# Patient Record
Sex: MALE | Race: WHITE | NOT HISPANIC OR LATINO | Employment: UNEMPLOYED | URBAN - METROPOLITAN AREA
[De-identification: names, ages, dates, MRNs, and addresses within clinical notes are randomized per-mention and may not be internally consistent; named-entity substitution may affect disease eponyms.]

---

## 2024-08-28 ENCOUNTER — OFFICE VISIT (OUTPATIENT)
Dept: FAMILY MEDICINE CLINIC | Facility: CLINIC | Age: 34
End: 2024-08-28
Payer: COMMERCIAL

## 2024-08-28 VITALS
HEIGHT: 73 IN | DIASTOLIC BLOOD PRESSURE: 68 MMHG | WEIGHT: 200 LBS | HEART RATE: 83 BPM | SYSTOLIC BLOOD PRESSURE: 118 MMHG | TEMPERATURE: 98.4 F | OXYGEN SATURATION: 98 % | BODY MASS INDEX: 26.51 KG/M2

## 2024-08-28 DIAGNOSIS — Z76.89 ENCOUNTER TO ESTABLISH CARE WITH NEW DOCTOR: ICD-10-CM

## 2024-08-28 DIAGNOSIS — Z23 IMMUNIZATION DUE: Primary | ICD-10-CM

## 2024-08-28 DIAGNOSIS — Z72.52 HIGH RISK HOMOSEXUAL BEHAVIOR: ICD-10-CM

## 2024-08-28 DIAGNOSIS — F17.200 NICOTINE DEPENDENCE, UNCOMPLICATED, UNSPECIFIED NICOTINE PRODUCT TYPE: ICD-10-CM

## 2024-08-28 DIAGNOSIS — L98.9 SKIN LESION: ICD-10-CM

## 2024-08-28 DIAGNOSIS — L70.9 ACNE, UNSPECIFIED ACNE TYPE: ICD-10-CM

## 2024-08-28 PROBLEM — F15.91 HISTORY OF METHAMPHETAMINE USE: Status: ACTIVE | Noted: 2024-08-05

## 2024-08-28 PROBLEM — F31.10 BIPOLAR AFFECTIVE DISORDER, CURRENT EPISODE MANIC: Status: ACTIVE | Noted: 2024-08-01

## 2024-08-28 PROBLEM — F29 PSYCHOSIS: Status: ACTIVE | Noted: 2024-08-02

## 2024-08-28 PROCEDURE — 99385 PREV VISIT NEW AGE 18-39: CPT | Performed by: FAMILY MEDICINE

## 2024-08-28 PROCEDURE — 90471 IMMUNIZATION ADMIN: CPT | Performed by: FAMILY MEDICINE

## 2024-08-28 PROCEDURE — 1126F AMNT PAIN NOTED NONE PRSNT: CPT | Performed by: FAMILY MEDICINE

## 2024-08-28 PROCEDURE — 2014F MENTAL STATUS ASSESS: CPT | Performed by: FAMILY MEDICINE

## 2024-08-28 PROCEDURE — 1159F MED LIST DOCD IN RCRD: CPT | Performed by: FAMILY MEDICINE

## 2024-08-28 PROCEDURE — 90715 TDAP VACCINE 7 YRS/> IM: CPT | Performed by: FAMILY MEDICINE

## 2024-08-28 PROCEDURE — 1160F RVW MEDS BY RX/DR IN RCRD: CPT | Performed by: FAMILY MEDICINE

## 2024-08-28 RX ORDER — RISPERIDONE 3 MG/1
3 TABLET ORAL 2 TIMES DAILY
COMMUNITY

## 2024-08-28 RX ORDER — HYDROXYZINE PAMOATE 25 MG/1
1 CAPSULE ORAL 3 TIMES DAILY
COMMUNITY
Start: 2024-08-22

## 2024-08-28 RX ORDER — NICOTINE 21 MG/24HR
1 PATCH, TRANSDERMAL 24 HOURS TRANSDERMAL EVERY 24 HOURS
Qty: 30 EACH | Refills: 0 | Status: SHIPPED | OUTPATIENT
Start: 2024-08-28

## 2024-08-28 RX ORDER — LITHIUM CARBONATE 300 MG/1
300 CAPSULE ORAL 2 TIMES DAILY WITH MEALS
COMMUNITY
Start: 2024-08-09

## 2024-08-28 NOTE — LETTER
[facility]  Vaccine Consent Form    Patient Name:  Zac Vizcarra  Patient :  1990     Vaccine(s) Ordered    Tdap Vaccine Greater Than or Equal To 6yo IM        Screening Checklist  The following questions should be completed prior to vaccination. If you answer “yes” to any question, it does not necessarily mean you should not be vaccinated. It just means we may need to clarify or ask more questions. If a question is unclear, please ask your healthcare provider to explain it.    Yes No   Any fever or moderate to severe illness today (mild illness and/or antibiotic treatment are not contraindications)?     Do you have a history of a serious reaction to any previous vaccinations, such as anaphylaxis, encephalopathy within 7 days, Guillain-Salisbury syndrome within 6 weeks, seizure?     Have you received any live vaccine(s) (e.g MMR, MARY) or any other vaccines in the last month (to ensure duplicate doses aren't given)?     Do you have an anaphylactic allergy to latex (DTaP, DTaP-IPV, Hep A, Hep B, MenB, RV, Td, Tdap), baker’s yeast (Hep B, HPV), polysorbates (RSV, nirsevimab, PCV 20, Rotavirrus, Tdap, Shingrix), or gelatin (MARY, MMR)?     Do you have an anaphylactic allergy to neomycin (Rabies, MARY, MMR, IPV, Hep A), polymyxin B (IPV), or streptomycin (IPV)?      Any cancer, leukemia, AIDS, or other immune system disorder? (MARY, MMR, RV)     Do you have a parent, brother, or sister with an immune system problem (if immune competence of vaccine recipient clinically verified, can proceed)? (MMR, MARY)     Any recent steroid treatments for >2 weeks, chemotherapy, or radiation treatment? (MARY, MMR)     Have you received antibody-containing blood transfusions or IVIG in the past 11 months (recommended interval is dependent on product)? (MMR, MARY)     Have you taken antiviral drugs (acyclovir, famciclovir, valacyclovir for MARY) in the last 24 or 48 hours, respectively?      Are you pregnant or planning to become  "pregnant within 1 month? (MARY, MMR, HPV, IPV, MenB, Abrexvy; For Hep B- refer to Engerix-B; For RSV - Abrysvo is indicated for 32-36 weeks of pregnancy from September to January)     For infants, have you ever been told your child has had intussusception or a medical emergency involving obstruction of the intestine (Rotavirus)? If not for an infant, can skip this question.         *Ordering Physicians/APC should be consulted if \"yes\" is checked by the patient or guardian above.  I have received, read, and understand the Vaccine Information Statement (VIS) for each vaccine ordered.  I have considered my or my child's health status as well as the health status of my close contacts.  I have taken the opportunity to discuss my vaccine questions with my or my child's health care provider.   I have requested that the ordered vaccine(s) be given to me or my child.  I understand the benefits and risks of the vaccines.  I understand that I should remain in the clinic for 15 minutes after receiving the vaccine(s).  _________________________________________________________  Signature of Patient or Parent/Legal Guardian ____________________  Date     "

## 2024-08-28 NOTE — PROGRESS NOTES
Male Physical Note      Date: 2024   Patient Name: Zac Vizcarra  : 1990   MRN: [id number]     Chief Complaint:    Chief Complaint   Patient presents with    Annual Exam    Establish Care       History of Present Illness: Zac Vizcarra is a 34 y.o. male who is here today for their annual health maintenance and physical.       Previously seeing:  Vanessa HATHAWAY [facility].  Lives on [address].    From reconcile outside meds, patient has been prescribed Lithium,Lisdexamphetamine 40 mg, hydroxyzine, risperidone 3 mg.   Appears this was prescribed by Zeb.      Sees NP Kirit Bustamante at [facility].  Reports he is also seeing therapist.  Was hospitalized for possible miko   Reports Taking Lithium, Vistaril and Risperdal.  Taking for about a month.    Thinks he has a mood disorder such as Bipolar.  Reports he had his levels checked    Was in Hospital about amonth ago.  [facility].    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    Reports had labs-uk in chart.      Subjective      Review of Systems:   Review of Systems   Constitutional:  Negative for chills and fever.   Gastrointestinal:  Positive for nausea (occ). Negative for vomiting.   Neurological:  Negative for seizures and syncope.   Psychiatric/Behavioral:  Negative for suicidal ideas.        Past Medical History, Social History, Family History and Care Team were all reviewed with patient and updated as appropriate.     Medications:     Current Outpatient Medications:     hydrOXYzine pamoate (VISTARIL) 25 MG capsule, Take 1 capsule by mouth 3 times a day., Disp: , Rfl:     lithium carbonate 300 MG capsule, Take 1 capsule by mouth 2 (Two) Times a Day With Meals., Disp: , Rfl:     risperiDONE (risperDAL) 3 MG tablet, Take 1 tablet by mouth 2 (Two) Times a Day., Disp: , Rfl:     nicotine (Nicoderm CQ) 14 MG/24HR patch, Place 1 patch on the skin as directed by provider Daily., Disp: 30  each, Rfl: 0    Allergies:   Allergies   Allergen Reactions    Codeine Nausea Only    Promethazine Nausea Only    Promethazine-Codeine Nausea Only       Immunizations:  Health Maintenance Summary            Overdue - BMI FOLLOWUP (Yearly) Never done      No completion, postpone, or frequency change history exists for this topic.              Overdue - Hepatitis B (3 of 3 - 3-dose series) Overdue since 7/1/2003 05/06/2003  Imm Admin: Hep B, Adolescent or Pediatric    10/01/2001  Imm Admin: Hep B, Adolescent or Pediatric              INFLUENZA VACCINE (Yearly - August to March) Due since 8/1/2024      No completion, postpone, or frequency change history exists for this topic.              COVID-19 Vaccine (3 - 2023-24 season) Due since 9/1/2024 08/28/2024  Postponed until 11/17/2024 by Zehra Du MA (Product Unavailable)    04/08/2021  Imm Admin: COVID-19 (PFIZER) Purple Cap Monovalent    03/11/2021  Imm Admin: COVID-19 (PFIZER) Purple Cap Monovalent              ANNUAL PHYSICAL (Yearly) Next due on 8/28/2025 08/28/2024  Done              TDAP/TD VACCINES (3 - Td or Tdap) Next due on 8/28/2034 08/28/2024  Imm Admin: Tdap    05/06/2003  Imm Admin: Td (TDVAX)              HEPATITIS C SCREENING  Completed      08/14/2024  Outside Procedure: CHG HEPATITIS C ANTIBODY              Pneumococcal Vaccine 0-64 (Series Information) Aged Out      No completion, postpone, or frequency change history exists for this topic.                    Orders Placed This Encounter   Procedures    Tdap Vaccine Greater Than or Equal To 6yo IM       Colorectal Screening:   na  Last Completed Colonoscopy       This patient has no relevant Health Maintenance data.          CT for Smoker (Age 50-80, 20pk yr within last 15 years):  na  Bone Density/DEXA (high risk): na  Hep C (Age 18-79 once):  prev  HIV (Age 15-65 once) : prev  PSA (Over age 50, C Level Recommendation):  na  US Aorta (For male smokers, age 65):  na  A1c:  "  Hemoglobin A1C   Date Value Ref Range Status   08/02/2024 5.2 <5.7 % Final     Lipid panel: No results found for: \"LABLIPI\"    The ASCVD Risk score (Radha VALIENTE, et al., 2019) failed to calculate for the following reasons:    The 2019 ASCVD risk score is only valid for ages 40 to 79    Dermatology: needs referral due to ache and dry skin.    Ophthalmologist: follows-Dr. Head  Dentist: follows-[facility].      Tobacco Use: Low Risk  (8/28/2024)    Patient History     Smoking Tobacco Use: Never     Smokeless Tobacco Use: Never     Passive Exposure: Past   Recent Concern: Tobacco Use - High Risk (8/1/2024)    Received from  Healthcare    Patient History     Smoking Tobacco Use: Some Days     Smokeless Tobacco Use: Unknown     Passive Exposure: Not on file       Social History     Substance and Sexual Activity   Alcohol Use Not Currently        Social History     Substance and Sexual Activity   Drug Use Not Currently    Types: Marijuana        Diet/Physical activity: restarted activity and trying to eat better.    Sexual health:   [place] man, monogamous relationship  Requests testing     PHQ-9 Depression Screening  PHQ-9 Total Score: 1      Measures:   Advanced Care Planning:   Patient does not have an advance directive, declines further assistance.    Smoking Cessation:   less than 3 minutes spent counseling Will try to cut down     Objective     Physical Exam:  Vital Signs:   Vitals:    08/28/24 1008   BP: 118/68   Pulse: 83   Temp: 98.4 °F (36.9 °C)   TempSrc: Temporal   SpO2: 98%   Weight: 90.7 kg (200 lb)   Height: 185.4 cm (73\")   PainSc: 0-No pain     Body mass index is 26.39 kg/m².   BMI is >= 25 and <30. (Overweight) The following options were offered after discussion;: exercise counseling/recommendations and nutrition counseling/recommendations       Physical Exam  Vitals and nursing note reviewed.   Constitutional:       Appearance: Normal appearance.   HENT:      Head: Normocephalic and atraumatic. "   Neck:      Vascular: No carotid bruit.   Cardiovascular:      Rate and Rhythm: Normal rate and regular rhythm.      Heart sounds: Normal heart sounds. No murmur heard.  Pulmonary:      Effort: Pulmonary effort is normal.      Breath sounds: Normal breath sounds.   Abdominal:      General: Bowel sounds are normal.      Palpations: Abdomen is soft. There is no mass.      Tenderness: There is no abdominal tenderness.   Musculoskeletal:      Right lower leg: No edema.      Left lower leg: No edema.   Skin:     Coloration: Skin is not jaundiced or pale.      Findings: No erythema.   Neurological:      Mental Status: He is alert. Mental status is at baseline.   Psychiatric:         Mood and Affect: Mood normal.         Behavior: Behavior normal.          POCT Results (if applicable):   No results found for this or any previous visit.    Procedures    Assessment / Plan      Assessment/Plan:     1. High risk homosexual behavior  We will check RPR, and hep B screening.  Patient was recently checked for hepatitis C, and HIV at .      - RPR Qualitative with Reflex to Quant; Future  - Hepatitis B Virus (HBV) Screening and Diagnosis; Future    2. Nicotine dependence, uncomplicated, unspecified nicotine product type  Patient to start nicotine Derm 14 mg patch daily.  Discussed risks of using the patch and continued smoking.    - nicotine (Nicoderm CQ) 14 MG/24HR patch; Place 1 patch on the skin as directed by provider Daily.  Dispense: 30 each; Refill: 0    3. Acne, unspecified acne type  Referral to dermatology as requested.  - Ambulatory Referral to Dermatology    4. Skin lesion  Referral to dermatology as requested.  - Ambulatory Referral to Dermatology    5. Immunization due  Tdap vaccine today.  - Tdap Vaccine Greater Than or Equal To 6yo IM    6. Encounter to establish care with new doctor        Part of this note may be an electronic transcription/translation of spoken language to printed text using the Dragon  Dictation System.        Healthcare Maintenance:  BMI is >= 25 and <30. (Overweight) The following options were offered after discussion;: exercise counseling/recommendations and nutrition counseling/recommendations    Zac JOY Vizcarra voices understanding and acceptance of this advice and will call back with any further questions or concerns. AVS with preventive healthcare tips printed for patient.  Patient Counseling:  Nutrition: Stressed importance of moderation in sodium/caffeine intake, saturated fat and cholesterol, caloric balance, sufficient intake of fresh fruits, vegetables, fiber, calcium and iron.   Exercise: Stressed the importance of regular exercise.   Substance Abuse: Discussed cessation/primary prevention of tobacco, alcohol or other drug use. Driving or other dangerous activities under the influence, availability of treatment or abuse.   Sexuality: Discussed sexually transmitted diseases, partner selection, use of condoms, avoidance or unintended pregnancy and contraceptive alternatives.   Injury prevention: Discussed safety belts, safety helmets, smote detector, smoking near bedding or upholstery.   Dental health: Discussed importance of regular brushing, flossing and dental visits.   Immunizations: Reviewed and discussed.   Colonoscopy: Discussed screening and benefits.   After hours services discussed with patient.     Vaccine Counseling:      Follow Up:   Return in about 3 months (around 11/28/2024) for Follow Up bmi.      DO TAYLOR Mitchell

## 2024-08-28 NOTE — PATIENT INSTRUCTIONS

## 2024-09-16 DIAGNOSIS — F17.200 NICOTINE DEPENDENCE, UNCOMPLICATED, UNSPECIFIED NICOTINE PRODUCT TYPE: ICD-10-CM

## 2024-09-16 RX ORDER — NICOTINE 21 MG/24HR
1 PATCH, TRANSDERMAL 24 HOURS TRANSDERMAL EVERY 24 HOURS
Qty: 30 EACH | Refills: 0 | Status: SHIPPED | OUTPATIENT
Start: 2024-09-16

## 2024-10-29 ENCOUNTER — OFFICE VISIT (OUTPATIENT)
Dept: FAMILY MEDICINE CLINIC | Facility: CLINIC | Age: 34
End: 2024-10-29
Payer: COMMERCIAL

## 2024-10-29 VITALS
BODY MASS INDEX: 26.77 KG/M2 | WEIGHT: 202 LBS | DIASTOLIC BLOOD PRESSURE: 70 MMHG | HEART RATE: 80 BPM | TEMPERATURE: 97.3 F | SYSTOLIC BLOOD PRESSURE: 120 MMHG | OXYGEN SATURATION: 99 % | HEIGHT: 73 IN

## 2024-10-29 DIAGNOSIS — Z23 IMMUNIZATION DUE: ICD-10-CM

## 2024-10-29 DIAGNOSIS — Z02.89 ENCOUNTER FOR PHYSICAL EXAMINATION RELATED TO EMPLOYMENT: Primary | ICD-10-CM

## 2024-10-29 PROCEDURE — 1159F MED LIST DOCD IN RCRD: CPT | Performed by: FAMILY MEDICINE

## 2024-10-29 PROCEDURE — 90656 IIV3 VACC NO PRSV 0.5 ML IM: CPT | Performed by: FAMILY MEDICINE

## 2024-10-29 PROCEDURE — 90471 IMMUNIZATION ADMIN: CPT | Performed by: FAMILY MEDICINE

## 2024-10-29 PROCEDURE — 1160F RVW MEDS BY RX/DR IN RCRD: CPT | Performed by: FAMILY MEDICINE

## 2024-10-29 PROCEDURE — 2014F MENTAL STATUS ASSESS: CPT | Performed by: FAMILY MEDICINE

## 2024-10-29 PROCEDURE — 99395 PREV VISIT EST AGE 18-39: CPT | Performed by: FAMILY MEDICINE

## 2024-10-29 PROCEDURE — 1126F AMNT PAIN NOTED NONE PRSNT: CPT | Performed by: FAMILY MEDICINE

## 2024-10-29 RX ORDER — ARIPIPRAZOLE 5 MG/1
5 TABLET ORAL EVERY MORNING
COMMUNITY
Start: 2024-10-04

## 2024-10-29 RX ORDER — PROPRANOLOL HYDROCHLORIDE 10 MG/1
1 TABLET ORAL 3 TIMES DAILY
COMMUNITY
Start: 2024-10-09

## 2024-10-29 NOTE — PROGRESS NOTES
Follow Up Office Visit      Date: 10/29/2024   Patient Name: Zac Vizcarra  : 1990   MRN: [id number]     Chief Complaint:    Chief Complaint   Patient presents with    Employment Physical       History of Present Illness: Zac Vizcarra is a 34 y.o. male who presents today for physical for job application.    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    Follows with psych.  [place] for psych.      Subjective      Review of Systems:   Review of Systems   Constitutional:  Negative for chills and fever.   Gastrointestinal:  Negative for nausea and vomiting.   Neurological:  Negative for seizures and syncope.   Psychiatric/Behavioral:  Positive for depressed mood. The patient is nervous/anxious.        I have reviewed the patients family history, social history, past medical history, past surgical history and have updated it as appropriate.     Medications:     Current Outpatient Medications:     ARIPiprazole (ABILIFY) 5 MG tablet, Take 1 tablet by mouth Every Morning., Disp: , Rfl:     hydrOXYzine pamoate (VISTARIL) 25 MG capsule, Take 1 capsule by mouth 3 times a day., Disp: , Rfl:     lithium carbonate 300 MG capsule, Take 1 capsule by mouth 2 (Two) Times a Day With Meals., Disp: , Rfl:     nicotine (Nicoderm CQ) 14 MG/24HR patch, Place 1 patch on the skin as directed by provider Daily., Disp: 30 each, Rfl: 0    propranolol (INDERAL) 10 MG tablet, Take 1 tablet by mouth 3 times a day., Disp: , Rfl:     risperiDONE (risperDAL) 3 MG tablet, Take 1 tablet by mouth 2 (Two) Times a Day. (Patient taking differently: Take 2 mg by mouth 2 (Two) Times a Day.), Disp: , Rfl:     Hepatitis B Vac Recomb Adj 20 MCG/0.5ML solution prefilled syringe, Inject 0.5 mL into the appropriate muscle as directed by prescriber 1 time for 1 dose., Disp: 1 each, Rfl: 0    Allergies:   Allergies   Allergen Reactions    Codeine Nausea Only    Promethazine Nausea Only    Promethazine-Codeine Nausea Only  "      Objective     Physical Exam: Please see above  Vital Signs:   Vitals:    10/29/24 1442   BP: 120/70   Pulse: 80   Temp: 97.3 °F (36.3 °C)   TempSrc: Infrared   SpO2: 99%   Weight: 91.6 kg (202 lb)   Height: 185.4 cm (72.99\")   PainSc: 0-No pain     Body mass index is 26.66 kg/m².          Physical Exam  Vitals and nursing note reviewed.   Constitutional:       Appearance: Normal appearance.   HENT:      Head: Normocephalic and atraumatic.   Neck:      Vascular: No carotid bruit.   Cardiovascular:      Rate and Rhythm: Normal rate and regular rhythm.      Heart sounds: Normal heart sounds. No murmur heard.  Pulmonary:      Effort: Pulmonary effort is normal.      Breath sounds: Normal breath sounds.   Abdominal:      General: Bowel sounds are normal.      Palpations: Abdomen is soft. There is no mass.      Tenderness: There is no abdominal tenderness.   Musculoskeletal:      Right lower leg: No edema.      Left lower leg: No edema.   Skin:     Coloration: Skin is not jaundiced or pale.      Findings: No erythema.   Neurological:      Mental Status: He is alert. Mental status is at baseline.   Psychiatric:         Mood and Affect: Mood normal.         Behavior: Behavior normal.         Procedures    Results:   Labs:   Hemoglobin A1C   Date Value Ref Range Status   08/02/2024 5.2 <5.7 % Final        POCT Results (if applicable):   No results found for this or any previous visit.    Imaging:   No valid procedures specified.     Measures:   Advanced Care Planning:   Patient does not have an advance directive, declines further assistance.    Smoking Cessation:   Does not smoke    Assessment / Plan      Assessment/Plan:     1. Encounter for physical examination related to employment  Patient with normal physical exam.  Patient does not have form for his physical with him.  He plans to check with employer to see if there is a form to bring in.    2. Immunization due  Our office is out of Hep B vaccine today.  Will " send order to pharmacy.    - Fluzone >6mos  - Hepatitis B Vaccine Adult IM (ENGERIX/RECOMBIVAX)      Vaccine Counseling:      Follow Up:   Return if symptoms worsen or fail to improve, or as scheduled.      DO TAYLOR Mitchell

## 2024-10-29 NOTE — PATIENT INSTRUCTIONS
Take medications as ordered.  Low fat, low salt diet.  Exercise as tolerated.  Continue to follow with psych.

## 2024-11-25 ENCOUNTER — TELEPHONE (OUTPATIENT)
Dept: FAMILY MEDICINE CLINIC | Facility: CLINIC | Age: 34
End: 2024-11-25

## 2025-07-21 ENCOUNTER — OFFICE VISIT (OUTPATIENT)
Dept: FAMILY MEDICINE CLINIC | Facility: CLINIC | Age: 35
End: 2025-07-21
Payer: COMMERCIAL

## 2025-07-21 VITALS
WEIGHT: 197.6 LBS | SYSTOLIC BLOOD PRESSURE: 122 MMHG | BODY MASS INDEX: 26.08 KG/M2 | OXYGEN SATURATION: 98 % | TEMPERATURE: 98.2 F | DIASTOLIC BLOOD PRESSURE: 70 MMHG | HEART RATE: 88 BPM

## 2025-07-21 DIAGNOSIS — N52.9 ERECTILE DYSFUNCTION, UNSPECIFIED ERECTILE DYSFUNCTION TYPE: Primary | ICD-10-CM

## 2025-07-21 DIAGNOSIS — F39 MOOD DISORDER: ICD-10-CM

## 2025-07-21 DIAGNOSIS — Z72.52 HIGH RISK HOMOSEXUAL BEHAVIOR: ICD-10-CM

## 2025-07-21 PROCEDURE — 99213 OFFICE O/P EST LOW 20 MIN: CPT | Performed by: FAMILY MEDICINE

## 2025-07-21 RX ORDER — SILDENAFIL 50 MG/1
50 TABLET, FILM COATED ORAL DAILY PRN
Qty: 8 TABLET | Refills: 1 | Status: SHIPPED | OUTPATIENT
Start: 2025-07-21

## 2025-07-21 NOTE — PROGRESS NOTES
Follow Up Office Visit      Date: 2025   Patient Name: Zac Vizcarra  : 1990   MRN: [id number]     Chief Complaint:    Chief Complaint   Patient presents with    Medication Reaction     Med refills. Questions concerning Ed and medication.        History of Present Illness: Zac Vizcarra is a 35 y.o. male who presents today       Patient would like to discuss being prescribed Viagra to help with the ED.  Reports he has trouble getting and keeping an erection and attributes this due to his psych medications.  Reports has been taking [place]w  Viagra type medicine he has been purchasing online.        Reports his psych doctor is prescribing lithium and Zyprexa.  Lithium 300 am and 600 at night.  Zyprexa in evening.  Following with [facility] and seeing Maria Esther Jose.      Has a partner.  Monogamous relationship.  Reports he may be interested in taking PrEP and/or Doxy Pep in the future.  Does not want to start this now.              History of Present Illness        Subjective      Review of Systems:   Review of Systems   Constitutional:  Negative for chills and fever.   Gastrointestinal:  Negative for nausea and vomiting.   Neurological:  Negative for seizures and syncope.       I have reviewed the patients family history, social history, past medical history, past surgical history and have updated it as appropriate.     Medications:     Current Outpatient Medications:     hydrOXYzine pamoate (VISTARIL) 25 MG capsule, Take 1 capsule by mouth 3 times a day. (Patient taking differently: Take 1 capsule by mouth As Needed.), Disp: , Rfl:     lithium carbonate 300 MG capsule, Take 1 capsule by mouth 2 (Two) Times a Day With Meals., Disp: , Rfl:     propranolol (INDERAL) 10 MG tablet, Take 1 tablet by mouth 3 times a day. (Patient taking differently: Take 1 tablet by mouth As Needed.), Disp: , Rfl:     ARIPiprazole (ABILIFY) 5 MG tablet, Take 1 tablet by mouth Every Morning. (Patient not taking:  Reported on 7/21/2025), Disp: , Rfl:     nicotine (Nicoderm CQ) 14 MG/24HR patch, Place 1 patch on the skin as directed by provider Daily. (Patient not taking: Reported on 7/21/2025), Disp: 30 each, Rfl: 0    risperiDONE (risperDAL) 3 MG tablet, Take 1 tablet by mouth 2 (Two) Times a Day. (Patient not taking: Reported on 7/21/2025), Disp: , Rfl:     sildenafil (VIAGRA) 50 MG tablet, Take 1 tablet by mouth Daily As Needed for Erectile Dysfunction., Disp: 8 tablet, Rfl: 1    Allergies:   Allergies   Allergen Reactions    Codeine Nausea Only    Promethazine Nausea Only    Promethazine-Codeine Nausea Only       Objective     Physical Exam: Please see above  Vital Signs:   Vitals:    07/21/25 1608   BP: 122/70   Pulse: 88   Temp: 98.2 °F (36.8 °C)   TempSrc: Temporal   SpO2: 98%   Weight: 89.6 kg (197 lb 9.6 oz)   PainSc: 0-No pain     Body mass index is 26.08 kg/m².          Physical Exam  Vitals and nursing note reviewed.   Constitutional:       Appearance: Normal appearance.   HENT:      Head: Normocephalic and atraumatic.   Neck:      Vascular: No carotid bruit.   Cardiovascular:      Rate and Rhythm: Normal rate and regular rhythm.      Heart sounds: Normal heart sounds. No murmur heard.  Pulmonary:      Effort: Pulmonary effort is normal.      Breath sounds: Normal breath sounds.   Abdominal:      General: Bowel sounds are normal.      Palpations: Abdomen is soft. There is no mass.      Tenderness: There is no abdominal tenderness.   Musculoskeletal:      Right lower leg: No edema.      Left lower leg: No edema.   Skin:     Coloration: Skin is not jaundiced or pale.      Findings: No erythema.   Neurological:      Mental Status: He is alert. Mental status is at baseline.   Psychiatric:         Mood and Affect: Mood normal.         Behavior: Behavior normal.         Procedures    Results:   Labs:   Hemoglobin A1C   Date Value Ref Range Status   06/30/2025 5.5 <5.7 % Final        POCT Results (if applicable):   No  results found for this or any previous visit.    PHQ-9: PHQ-9 Total Score: 9     Imaging:   No valid procedures specified.     Measures:   Advanced Care Planning:   Patient does not have an advance directive, declines further assistance.    Smoking Cessation:   Does not smoke    Assessment / Plan      Assessment/Plan:     Assessment & Plan      1. Erectile dysfunction, unspecified erectile dysfunction type  We will do trial of Viagra 50 mg tablet as needed.  Discussed risks of erection lasting over 4 hours, as well as risk of nitroglycerin or similar medication being administered while taking Viagra.  Patient expressed understanding.  Agreed to notify provider if being seen for chest pain, and agreed to go to ER if he has an erection that lasts over 4 hours.    - sildenafil (VIAGRA) 50 MG tablet; Take 1 tablet by mouth Daily As Needed for Erectile Dysfunction.  Dispense: 8 tablet; Refill: 1    2. Mood disorder  Patient to continue to follow with psych    3. High risk homosexual behavior  May want to start PrEP and possibly Doxy Pep in the future.          Part of this note may be an electronic transcription/translation of spoken language to printed text using the Dragon Dictation System.        Vaccine Counseling:      Follow Up:   Return in about 3 months (around 10/21/2025) for Follow Up.          TAYLOR Maciel